# Patient Record
Sex: FEMALE | Race: WHITE | Employment: UNEMPLOYED | URBAN - METROPOLITAN AREA
[De-identification: names, ages, dates, MRNs, and addresses within clinical notes are randomized per-mention and may not be internally consistent; named-entity substitution may affect disease eponyms.]

---

## 2021-12-28 ENCOUNTER — HOSPITAL ENCOUNTER (EMERGENCY)
Age: 26
Discharge: HOME | DRG: 832 | End: 2021-12-28
Attending: EMERGENCY MEDICINE
Payer: MEDICARE

## 2021-12-28 ENCOUNTER — APPOINTMENT (OUTPATIENT)
Dept: ULTRASOUND IMAGING | Age: 26
DRG: 832 | End: 2021-12-28
Payer: MEDICARE

## 2021-12-28 ENCOUNTER — HOSPITAL ENCOUNTER (INPATIENT)
Age: 26
LOS: 1 days | Discharge: HOME | DRG: 832 | End: 2021-12-29
Attending: EMERGENCY MEDICINE | Admitting: OBSTETRICS & GYNECOLOGY
Payer: MEDICARE

## 2021-12-28 VITALS
SYSTOLIC BLOOD PRESSURE: 125 MMHG | RESPIRATION RATE: 20 BRPM | OXYGEN SATURATION: 98 % | DIASTOLIC BLOOD PRESSURE: 75 MMHG | HEART RATE: 86 BPM

## 2021-12-28 DIAGNOSIS — R11.15 CYCLICAL VOMITING SYNDROME: Primary | ICD-10-CM

## 2021-12-28 DIAGNOSIS — R11.2 NAUSEA AND VOMITING, INTRACTABILITY OF VOMITING NOT SPECIFIED, UNSPECIFIED VOMITING TYPE: ICD-10-CM

## 2021-12-28 DIAGNOSIS — F41.1 ANXIETY STATE: ICD-10-CM

## 2021-12-28 DIAGNOSIS — R10.9 INTRACTABLE ABDOMINAL PAIN: Primary | ICD-10-CM

## 2021-12-28 LAB
ACETAMINOPHEN LEVEL: <5 MCG/ML (ref 10–30)
ALBUMIN SERPL-MCNC: 4.6 G/DL (ref 3.5–5.2)
ALBUMIN SERPL-MCNC: 4.8 G/DL (ref 3.5–5.2)
ALP BLD-CCNC: 57 U/L (ref 35–104)
ALP BLD-CCNC: 62 U/L (ref 35–104)
ALT SERPL-CCNC: 14 U/L (ref 0–32)
ALT SERPL-CCNC: 22 U/L (ref 0–32)
AMPHETAMINE SCREEN, URINE: NOT DETECTED
ANION GAP SERPL CALCULATED.3IONS-SCNC: 17 MMOL/L (ref 7–16)
ANION GAP SERPL CALCULATED.3IONS-SCNC: 17 MMOL/L (ref 7–16)
AST SERPL-CCNC: 23 U/L (ref 0–31)
AST SERPL-CCNC: 84 U/L (ref 0–31)
BARBITURATE SCREEN URINE: NOT DETECTED
BASOPHILS ABSOLUTE: 0 E9/L (ref 0–0.2)
BASOPHILS ABSOLUTE: 0.04 E9/L (ref 0–0.2)
BASOPHILS RELATIVE PERCENT: 0.1 % (ref 0–2)
BASOPHILS RELATIVE PERCENT: 0.3 % (ref 0–2)
BENZODIAZEPINE SCREEN, URINE: NOT DETECTED
BILIRUB SERPL-MCNC: 0.4 MG/DL (ref 0–1.2)
BILIRUB SERPL-MCNC: 0.5 MG/DL (ref 0–1.2)
BUN BLDV-MCNC: 10 MG/DL (ref 6–20)
BUN BLDV-MCNC: 11 MG/DL (ref 6–20)
CALCIUM SERPL-MCNC: 9.4 MG/DL (ref 8.6–10.2)
CALCIUM SERPL-MCNC: 9.5 MG/DL (ref 8.6–10.2)
CANNABINOID SCREEN URINE: POSITIVE
CHLORIDE BLD-SCNC: 100 MMOL/L (ref 98–107)
CHLORIDE BLD-SCNC: 102 MMOL/L (ref 98–107)
CO2: 16 MMOL/L (ref 22–29)
CO2: 19 MMOL/L (ref 22–29)
COCAINE METABOLITE SCREEN URINE: NOT DETECTED
CREAT SERPL-MCNC: 0.4 MG/DL (ref 0.5–1)
CREAT SERPL-MCNC: 0.4 MG/DL (ref 0.5–1)
EKG ATRIAL RATE: 80 BPM
EKG P AXIS: 66 DEGREES
EKG P-R INTERVAL: 124 MS
EKG Q-T INTERVAL: 410 MS
EKG QRS DURATION: 74 MS
EKG QTC CALCULATION (BAZETT): 472 MS
EKG R AXIS: 26 DEGREES
EKG T AXIS: 41 DEGREES
EKG VENTRICULAR RATE: 80 BPM
EOSINOPHILS ABSOLUTE: 0 E9/L (ref 0.05–0.5)
EOSINOPHILS ABSOLUTE: 0.07 E9/L (ref 0.05–0.5)
EOSINOPHILS RELATIVE PERCENT: 0 % (ref 0–6)
EOSINOPHILS RELATIVE PERCENT: 0.6 % (ref 0–6)
ETHANOL: <10 MG/DL (ref 0–0.08)
FENTANYL SCREEN, URINE: NOT DETECTED
GFR AFRICAN AMERICAN: >60
GFR AFRICAN AMERICAN: >60
GFR NON-AFRICAN AMERICAN: >60 ML/MIN/1.73
GFR NON-AFRICAN AMERICAN: >60 ML/MIN/1.73
GLUCOSE BLD-MCNC: 128 MG/DL (ref 74–99)
GLUCOSE BLD-MCNC: 133 MG/DL (ref 74–99)
GONADOTROPIN, CHORIONIC (HCG) QUANT: ABNORMAL MIU/ML
HCG, URINE, POC: POSITIVE
HCT VFR BLD CALC: 36.9 % (ref 34–48)
HCT VFR BLD CALC: 40 % (ref 34–48)
HEMOGLOBIN: 13.2 G/DL (ref 11.5–15.5)
HEMOGLOBIN: 14.3 G/DL (ref 11.5–15.5)
IMMATURE GRANULOCYTES #: 0.06 E9/L
IMMATURE GRANULOCYTES %: 0.5 % (ref 0–5)
LIPASE: 22 U/L (ref 13–60)
LYMPHOCYTES ABSOLUTE: 0.5 E9/L (ref 1.5–4)
LYMPHOCYTES ABSOLUTE: 2.61 E9/L (ref 1.5–4)
LYMPHOCYTES RELATIVE PERCENT: 1.7 % (ref 20–42)
LYMPHOCYTES RELATIVE PERCENT: 21.4 % (ref 20–42)
Lab: ABNORMAL
Lab: NORMAL
MAGNESIUM: 1.9 MG/DL (ref 1.6–2.6)
MAGNESIUM: 2 MG/DL (ref 1.6–2.6)
MCH RBC QN AUTO: 32.3 PG (ref 26–35)
MCH RBC QN AUTO: 32.4 PG (ref 26–35)
MCHC RBC AUTO-ENTMCNC: 35.8 % (ref 32–34.5)
MCHC RBC AUTO-ENTMCNC: 35.8 % (ref 32–34.5)
MCV RBC AUTO: 90.3 FL (ref 80–99.9)
MCV RBC AUTO: 90.7 FL (ref 80–99.9)
METHADONE SCREEN, URINE: NOT DETECTED
MONOCYTES ABSOLUTE: 0.5 E9/L (ref 0.1–0.95)
MONOCYTES ABSOLUTE: 0.75 E9/L (ref 0.1–0.95)
MONOCYTES RELATIVE PERCENT: 3.4 % (ref 2–12)
MONOCYTES RELATIVE PERCENT: 4.1 % (ref 2–12)
NEGATIVE QC PASS/FAIL: NORMAL
NEUTROPHILS ABSOLUTE: 23.66 E9/L (ref 1.8–7.3)
NEUTROPHILS ABSOLUTE: 8.91 E9/L (ref 1.8–7.3)
NEUTROPHILS RELATIVE PERCENT: 73.1 % (ref 43–80)
NEUTROPHILS RELATIVE PERCENT: 94.8 % (ref 43–80)
OPIATE SCREEN URINE: POSITIVE
OXYCODONE URINE: NOT DETECTED
PDW BLD-RTO: 11.7 FL (ref 11.5–15)
PDW BLD-RTO: 11.8 FL (ref 11.5–15)
PHENCYCLIDINE SCREEN URINE: NOT DETECTED
PLATELET # BLD: 378 E9/L (ref 130–450)
PLATELET # BLD: 404 E9/L (ref 130–450)
PMV BLD AUTO: 9 FL (ref 7–12)
PMV BLD AUTO: 9.4 FL (ref 7–12)
POSITIVE QC PASS/FAIL: NORMAL
POTASSIUM SERPL-SCNC: 3.4 MMOL/L (ref 3.5–5)
POTASSIUM SERPL-SCNC: 3.5 MMOL/L (ref 3.5–5)
RBC # BLD: 4.07 E12/L (ref 3.5–5.5)
RBC # BLD: 4.43 E12/L (ref 3.5–5.5)
SALICYLATE, SERUM: <0.3 MG/DL (ref 0–30)
SARS-COV-2, NAAT: NOT DETECTED
SODIUM BLD-SCNC: 135 MMOL/L (ref 132–146)
SODIUM BLD-SCNC: 136 MMOL/L (ref 132–146)
TOTAL PROTEIN: 8.1 G/DL (ref 6.4–8.3)
TOTAL PROTEIN: 8.4 G/DL (ref 6.4–8.3)
TRICYCLIC ANTIDEPRESSANTS SCREEN SERUM: NEGATIVE NG/ML
TROPONIN, HIGH SENSITIVITY: <6 NG/L (ref 0–9)
WBC # BLD: 12.2 E9/L (ref 4.5–11.5)
WBC # BLD: 24.9 E9/L (ref 4.5–11.5)

## 2021-12-28 PROCEDURE — 80053 COMPREHEN METABOLIC PANEL: CPT

## 2021-12-28 PROCEDURE — 36415 COLL VENOUS BLD VENIPUNCTURE: CPT

## 2021-12-28 PROCEDURE — 96372 THER/PROPH/DIAG INJ SC/IM: CPT

## 2021-12-28 PROCEDURE — 6360000002 HC RX W HCPCS: Performed by: OBSTETRICS & GYNECOLOGY

## 2021-12-28 PROCEDURE — 80307 DRUG TEST PRSMV CHEM ANLYZR: CPT

## 2021-12-28 PROCEDURE — 83735 ASSAY OF MAGNESIUM: CPT

## 2021-12-28 PROCEDURE — 84702 CHORIONIC GONADOTROPIN TEST: CPT

## 2021-12-28 PROCEDURE — 6370000000 HC RX 637 (ALT 250 FOR IP): Performed by: STUDENT IN AN ORGANIZED HEALTH CARE EDUCATION/TRAINING PROGRAM

## 2021-12-28 PROCEDURE — 2580000003 HC RX 258: Performed by: OBSTETRICS & GYNECOLOGY

## 2021-12-28 PROCEDURE — 84484 ASSAY OF TROPONIN QUANT: CPT

## 2021-12-28 PROCEDURE — 2580000003 HC RX 258: Performed by: STUDENT IN AN ORGANIZED HEALTH CARE EDUCATION/TRAINING PROGRAM

## 2021-12-28 PROCEDURE — 85025 COMPLETE CBC W/AUTO DIFF WBC: CPT

## 2021-12-28 PROCEDURE — 2500000003 HC RX 250 WO HCPCS: Performed by: STUDENT IN AN ORGANIZED HEALTH CARE EDUCATION/TRAINING PROGRAM

## 2021-12-28 PROCEDURE — 6360000002 HC RX W HCPCS

## 2021-12-28 PROCEDURE — 1220000001 HC SEMI PRIVATE L&D R&B

## 2021-12-28 PROCEDURE — 6360000002 HC RX W HCPCS: Performed by: STUDENT IN AN ORGANIZED HEALTH CARE EDUCATION/TRAINING PROGRAM

## 2021-12-28 PROCEDURE — 2500000003 HC RX 250 WO HCPCS: Performed by: OBSTETRICS & GYNECOLOGY

## 2021-12-28 PROCEDURE — 96374 THER/PROPH/DIAG INJ IV PUSH: CPT

## 2021-12-28 PROCEDURE — 96375 TX/PRO/DX INJ NEW DRUG ADDON: CPT

## 2021-12-28 PROCEDURE — 6360000002 HC RX W HCPCS: Performed by: EMERGENCY MEDICINE

## 2021-12-28 PROCEDURE — 99284 EMERGENCY DEPT VISIT MOD MDM: CPT

## 2021-12-28 PROCEDURE — 99283 EMERGENCY DEPT VISIT LOW MDM: CPT

## 2021-12-28 PROCEDURE — 82077 ASSAY SPEC XCP UR&BREATH IA: CPT

## 2021-12-28 PROCEDURE — 83690 ASSAY OF LIPASE: CPT

## 2021-12-28 PROCEDURE — 93005 ELECTROCARDIOGRAM TRACING: CPT | Performed by: EMERGENCY MEDICINE

## 2021-12-28 PROCEDURE — 80179 DRUG ASSAY SALICYLATE: CPT

## 2021-12-28 PROCEDURE — 80143 DRUG ASSAY ACETAMINOPHEN: CPT

## 2021-12-28 PROCEDURE — 87635 SARS-COV-2 COVID-19 AMP PRB: CPT

## 2021-12-28 PROCEDURE — 2580000003 HC RX 258

## 2021-12-28 PROCEDURE — 76801 OB US < 14 WKS SINGLE FETUS: CPT

## 2021-12-28 RX ORDER — ONDANSETRON 2 MG/ML
4 INJECTION INTRAMUSCULAR; INTRAVENOUS EVERY 8 HOURS PRN
Status: DISCONTINUED | OUTPATIENT
Start: 2021-12-28 | End: 2021-12-29 | Stop reason: HOSPADM

## 2021-12-28 RX ORDER — HYDROXYZINE HYDROCHLORIDE 50 MG/ML
50 INJECTION, SOLUTION INTRAMUSCULAR ONCE
Status: COMPLETED | OUTPATIENT
Start: 2021-12-28 | End: 2021-12-28

## 2021-12-28 RX ORDER — DIPHENHYDRAMINE HYDROCHLORIDE 50 MG/ML
25 INJECTION INTRAMUSCULAR; INTRAVENOUS ONCE
Status: COMPLETED | OUTPATIENT
Start: 2021-12-28 | End: 2021-12-28

## 2021-12-28 RX ORDER — METOCLOPRAMIDE HYDROCHLORIDE 5 MG/ML
10 INJECTION INTRAMUSCULAR; INTRAVENOUS ONCE
Status: COMPLETED | OUTPATIENT
Start: 2021-12-28 | End: 2021-12-28

## 2021-12-28 RX ORDER — ACETAMINOPHEN 325 MG/1
650 TABLET ORAL EVERY 6 HOURS PRN
Status: DISCONTINUED | OUTPATIENT
Start: 2021-12-28 | End: 2021-12-29 | Stop reason: HOSPADM

## 2021-12-28 RX ORDER — SODIUM CHLORIDE 0.9 % (FLUSH) 0.9 %
SYRINGE (ML) INJECTION
Status: COMPLETED
Start: 2021-12-28 | End: 2021-12-28

## 2021-12-28 RX ORDER — DROPERIDOL 2.5 MG/ML
1.25 INJECTION, SOLUTION INTRAMUSCULAR; INTRAVENOUS ONCE
Status: COMPLETED | OUTPATIENT
Start: 2021-12-28 | End: 2021-12-28

## 2021-12-28 RX ORDER — 0.9 % SODIUM CHLORIDE 0.9 %
1000 INTRAVENOUS SOLUTION INTRAVENOUS ONCE
Status: COMPLETED | OUTPATIENT
Start: 2021-12-28 | End: 2021-12-28

## 2021-12-28 RX ORDER — PROMETHAZINE HYDROCHLORIDE 25 MG/ML
25 INJECTION, SOLUTION INTRAMUSCULAR; INTRAVENOUS ONCE
Status: COMPLETED | OUTPATIENT
Start: 2021-12-28 | End: 2021-12-28

## 2021-12-28 RX ORDER — DIPHENHYDRAMINE HYDROCHLORIDE 50 MG/ML
INJECTION INTRAMUSCULAR; INTRAVENOUS
Status: COMPLETED
Start: 2021-12-28 | End: 2021-12-28

## 2021-12-28 RX ORDER — PRENATAL WITH FERROUS FUM AND FOLIC ACID 3080; 920; 120; 400; 22; 1.84; 3; 20; 10; 1; 12; 200; 27; 25; 2 [IU]/1; [IU]/1; MG/1; [IU]/1; MG/1; MG/1; MG/1; MG/1; MG/1; MG/1; UG/1; MG/1; MG/1; MG/1; MG/1
1 TABLET ORAL DAILY
Status: DISCONTINUED | OUTPATIENT
Start: 2021-12-29 | End: 2021-12-29 | Stop reason: HOSPADM

## 2021-12-28 RX ORDER — SUCRALFATE 1 G/1
1 TABLET ORAL ONCE
Status: COMPLETED | OUTPATIENT
Start: 2021-12-28 | End: 2021-12-28

## 2021-12-28 RX ORDER — ACETAMINOPHEN 500 MG
1000 TABLET ORAL ONCE
Status: COMPLETED | OUTPATIENT
Start: 2021-12-28 | End: 2021-12-28

## 2021-12-28 RX ORDER — MORPHINE SULFATE 4 MG/ML
4 INJECTION, SOLUTION INTRAMUSCULAR; INTRAVENOUS ONCE
Status: COMPLETED | OUTPATIENT
Start: 2021-12-28 | End: 2021-12-28

## 2021-12-28 RX ADMIN — FAMOTIDINE 20 MG: 10 INJECTION, SOLUTION INTRAVENOUS at 12:01

## 2021-12-28 RX ADMIN — FAMOTIDINE 20 MG: 10 INJECTION, SOLUTION INTRAVENOUS at 23:46

## 2021-12-28 RX ADMIN — DROPERIDOL 1.25 MG: 2.5 INJECTION, SOLUTION INTRAMUSCULAR; INTRAVENOUS at 13:20

## 2021-12-28 RX ADMIN — ONDANSETRON 4 MG: 2 INJECTION INTRAMUSCULAR; INTRAVENOUS at 23:41

## 2021-12-28 RX ADMIN — DIPHENHYDRAMINE HYDROCHLORIDE 25 MG: 50 INJECTION, SOLUTION INTRAMUSCULAR; INTRAVENOUS at 09:08

## 2021-12-28 RX ADMIN — SODIUM CHLORIDE 1000 ML: 9 INJECTION, SOLUTION INTRAVENOUS at 21:30

## 2021-12-28 RX ADMIN — METOCLOPRAMIDE 10 MG: 5 INJECTION, SOLUTION INTRAMUSCULAR; INTRAVENOUS at 08:24

## 2021-12-28 RX ADMIN — HYDROXYZINE HYDROCHLORIDE 50 MG: 50 INJECTION, SOLUTION INTRAMUSCULAR at 10:05

## 2021-12-28 RX ADMIN — Medication: at 08:24

## 2021-12-28 RX ADMIN — MORPHINE SULFATE 4 MG: 4 INJECTION, SOLUTION INTRAMUSCULAR; INTRAVENOUS at 12:03

## 2021-12-28 RX ADMIN — DIPHENHYDRAMINE HYDROCHLORIDE: 50 INJECTION INTRAMUSCULAR; INTRAVENOUS at 09:28

## 2021-12-28 RX ADMIN — SUCRALFATE 1 G: 1 TABLET ORAL at 19:48

## 2021-12-28 RX ADMIN — PROMETHAZINE HYDROCHLORIDE 25 MG: 25 INJECTION INTRAMUSCULAR; INTRAVENOUS at 11:25

## 2021-12-28 RX ADMIN — METOCLOPRAMIDE 10 MG: 5 INJECTION, SOLUTION INTRAMUSCULAR; INTRAVENOUS at 19:48

## 2021-12-28 RX ADMIN — DIPHENHYDRAMINE HYDROCHLORIDE 25 MG: 50 INJECTION, SOLUTION INTRAMUSCULAR; INTRAVENOUS at 19:48

## 2021-12-28 RX ADMIN — AMPICILLIN SODIUM 2000 MG: 2 INJECTION, POWDER, FOR SOLUTION INTRAMUSCULAR; INTRAVENOUS at 23:59

## 2021-12-28 RX ADMIN — DIPHENHYDRAMINE HYDROCHLORIDE: 50 INJECTION, SOLUTION INTRAMUSCULAR; INTRAVENOUS at 09:28

## 2021-12-28 RX ADMIN — SODIUM CHLORIDE 1000 ML: 9 INJECTION, SOLUTION INTRAVENOUS at 12:00

## 2021-12-28 RX ADMIN — ACETAMINOPHEN 1000 MG: 500 TABLET, COATED ORAL at 22:31

## 2021-12-28 ASSESSMENT — ENCOUNTER SYMPTOMS
DIARRHEA: 0
RHINORRHEA: 0
EYE PAIN: 0
COUGH: 0
BACK PAIN: 0
WHEEZING: 0
ABDOMINAL PAIN: 1
NAUSEA: 1
SHORTNESS OF BREATH: 0
TROUBLE SWALLOWING: 0
SINUS PAIN: 0
SORE THROAT: 0
VOMITING: 0

## 2021-12-28 ASSESSMENT — PAIN SCALES - GENERAL
PAINLEVEL_OUTOF10: 10
PAINLEVEL_OUTOF10: 0
PAINLEVEL_OUTOF10: 10

## 2021-12-28 ASSESSMENT — PAIN DESCRIPTION - PAIN TYPE: TYPE: ACUTE PAIN

## 2021-12-28 ASSESSMENT — PAIN DESCRIPTION - LOCATION: LOCATION: ABDOMEN

## 2021-12-28 ASSESSMENT — PAIN DESCRIPTION - FREQUENCY: FREQUENCY: CONTINUOUS

## 2021-12-28 NOTE — ED NOTES
Additional 25 benadryl IM given per verbal order from dr Danielle Tavares, RN  12/28/21 [facility] [place] [facility]  12/28/21 8667

## 2021-12-28 NOTE — ED NOTES
Pt. unable to sit still and refuses EKG at this time     [facility]  12/28/21 0537

## 2021-12-28 NOTE — ED NOTES
Patient c/o chest pain again is unable to sit still for complete traige. Patient has multiple episodes of emesis.      Savanna Brown  12/28/21 8243

## 2021-12-28 NOTE — ED NOTES
Patient not answering triage questions , rolling around on cart. Here earlier today.  Triage information from earlier visit     Martha Hernandez RN  12/28/21 6171

## 2021-12-28 NOTE — ED NOTES
Bed: 22  Expected date:   Expected time:   Means of arrival:   Comments:     Rolo Bhakta RN  12/28/21 8391

## 2021-12-28 NOTE — ED NOTES
Pt refusing vitals states in too much pain     Alex [facility]  12/28/21 9676

## 2021-12-28 NOTE — ED PROVIDER NOTES
24-year-old G2, P1 currently 9-1/2 weeks pregnant female presents today to the emergency department with complaints of abdominal pain onset 1 hour prior to arrival today. Patient states that her pain is localized to the epigastrium and radiates into the chest.  She states that is radiating to her back. Symptoms are associated with nausea and anxiety. Patient states that she has a history of gastroparesis that is normally controlled with Ativan and morphine. She states that she has not had any Ativan or morphine for 7 weeks since she found out she was pregnant. Patient denies any fevers or chills. She denies any recent alcohol or drug use. She states that the symptoms were sudden onset, moderate in severity and progressively got worse. She denies any aggravating or alleviating factors. She continues to request Ativan and morphine stating that her anxiety is out of control. Review of Systems   Constitutional: Negative for diaphoresis and fever. HENT: Negative for ear pain, hearing loss, rhinorrhea, sinus pain, sore throat and trouble swallowing. Eyes: Negative for pain. Respiratory: Negative for cough, shortness of breath and wheezing. Cardiovascular: Negative for chest pain and palpitations. Gastrointestinal: Positive for abdominal pain and nausea. Negative for diarrhea and vomiting. Endocrine: Negative for polyuria. Genitourinary: Negative for flank pain, frequency, hematuria and urgency. Musculoskeletal: Negative for back pain, neck pain and neck stiffness. Neurological: Negative for dizziness, speech difficulty, weakness, light-headedness and numbness. Psychiatric/Behavioral: Positive for agitation. Negative for confusion. The patient is not nervous/anxious. Physical Exam  Constitutional:       General: She is not in acute distress. Appearance: Normal appearance. She is not ill-appearing, toxic-appearing or diaphoretic.    HENT:      Head: Normocephalic and atraumatic. Nose: No rhinorrhea. Eyes:      General: No scleral icterus. Extraocular Movements: Extraocular movements intact. Pupils: Pupils are equal, round, and reactive to light. Cardiovascular:      Heart sounds: Normal heart sounds. No murmur heard. No friction rub. No gallop. Pulmonary:      Breath sounds: Normal breath sounds. No wheezing, rhonchi or rales. Abdominal:      Palpations: Abdomen is soft. Tenderness: There is no abdominal tenderness. Musculoskeletal:         General: No swelling. Cervical back: Normal range of motion. No rigidity or tenderness. Right lower leg: No edema. Left lower leg: No edema. Lymphadenopathy:      Cervical: No cervical adenopathy. Skin:     General: Skin is warm and dry. Coloration: Skin is not jaundiced. Neurological:      General: No focal deficit present. Mental Status: She is alert and oriented to person, place, and time. Cranial Nerves: No cranial nerve deficit. Sensory: No sensory deficit. Motor: No weakness. Psychiatric:         Mood and Affect: Mood is anxious. Behavior: Behavior is agitated and hyperactive. Thought Content: Thought content normal.         Judgment: Judgment is impulsive and inappropriate. Procedures     MDM  Number of Diagnoses or Management Options  Anxiety state  Cyclical vomiting syndrome  Diagnosis management comments: This is a 68-year-old female who presents today to the emergency department with complaints of abdominal pain and nausea. Patient is visiting from [place] and does not have any healthcare providers in [place]. Symptoms started this morning. Patient states that she has a history of gastroparesis that is normally controlled with Ativan and morphine. Patient is currently 9 and half weeks pregnant. On arrival to the emergency department patient is tearful. She reports diffuse abdominal pain. She was given a shot of Phenergan. OB/GYN in [place]. Amount and/or Complexity of Data Reviewed  Clinical lab tests: reviewed  Tests in the radiology section of CPT®: reviewed  Tests in the medicine section of CPT®: reviewed         ED Course as of 12/28/21 1556   Tue Dec 28, 2021   1318 Drug Screen Comment[de-identified] see below [NS]      ED Course User Index  [NS] Yuly Anand DO       ED Course as of 12/28/21 1556   Tue Dec 28, 2021   1318 Drug Screen Comment[de-identified] see below [NS]      ED Course User Index  [NS] Yuly Anand DO       --------------------------------------------- PAST HISTORY ---------------------------------------------  Past Medical History:  has no past medical history on file. Past Surgical History:  has no past surgical history on file. Social History:  reports that she has never smoked. She has never used smokeless tobacco. She reports that she does not use drugs. Family History: family history is not on file. The patients home medications have been reviewed. Allergies: Patient has no known allergies.     -------------------------------------------------- RESULTS -------------------------------------------------  Labs:  Results for orders placed or performed during the hospital encounter of 12/28/21   CBC Auto Differential   Result Value Ref Range    WBC 12.2 (H) 4.5 - 11.5 E9/L    RBC 4.43 3.50 - 5.50 E12/L    Hemoglobin 14.3 11.5 - 15.5 g/dL    Hematocrit 40.0 34.0 - 48.0 %    MCV 90.3 80.0 - 99.9 fL    MCH 32.3 26.0 - 35.0 pg    MCHC 35.8 (H) 32.0 - 34.5 %    RDW 11.7 11.5 - 15.0 fL    Platelets 781 057 - 923 E9/L    MPV 9.4 7.0 - 12.0 fL    Neutrophils % 73.1 43.0 - 80.0 %    Immature Granulocytes % 0.5 0.0 - 5.0 %    Lymphocytes % 21.4 20.0 - 42.0 %    Monocytes % 4.1 2.0 - 12.0 %    Eosinophils % 0.6 0.0 - 6.0 %    Basophils % 0.3 0.0 - 2.0 %    Neutrophils Absolute 8.91 (H) 1.80 - 7.30 E9/L    Immature Granulocytes # 0.06 E9/L    Lymphocytes Absolute 2.61 1.50 - 4.00 E9/L    Monocytes Absolute 0.50 0.10 - 0.95 E9/L    Eosinophils Absolute 0.07 0.05 - 0.50 E9/L    Basophils Absolute 0.04 0.00 - 0.20 E9/L   Comprehensive Metabolic Panel   Result Value Ref Range    Sodium 136 132 - 146 mmol/L    Potassium 3.5 3.5 - 5.0 mmol/L    Chloride 100 98 - 107 mmol/L    CO2 19 (L) 22 - 29 mmol/L    Anion Gap 17 (H) 7 - 16 mmol/L    Glucose 133 (H) 74 - 99 mg/dL    BUN 10 6 - 20 mg/dL    CREATININE 0.4 (L) 0.5 - 1.0 mg/dL    GFR Non-African American >60 >=60 mL/min/1.73    GFR African American >60     Calcium 9.5 8.6 - 10.2 mg/dL    Total Protein 8.1 6.4 - 8.3 g/dL    Albumin 4.6 3.5 - 5.2 g/dL    Total Bilirubin 0.5 0.0 - 1.2 mg/dL    Alkaline Phosphatase 62 35 - 104 U/L    ALT 14 0 - 32 U/L    AST 23 0 - 31 U/L   Magnesium   Result Value Ref Range    Magnesium 2.0 1.6 - 2.6 mg/dL   Troponin   Result Value Ref Range    Troponin, High Sensitivity <6 0 - 9 ng/L   URINE DRUG SCREEN   Result Value Ref Range    Drug Screen Comment: see below    Serum Drug Screen   Result Value Ref Range    Ethanol Lvl <10 mg/dL    Acetaminophen Level <5.0 (L) 10.0 - 10.1 mcg/mL    Salicylate, Serum <9.9 0.0 - 30.0 mg/dL   hCG, quantitative, pregnancy   Result Value Ref Range    hCG Quant 25667.0 (H) <10 mIU/mL   POC Pregnancy Urine Qual   Result Value Ref Range    HCG, Urine, POC Positive Negative    Lot Number [id number]     Positive QC Pass/Fail Pass     Negative QC Pass/Fail Pass    EKG 12 Lead   Result Value Ref Range    Ventricular Rate 80 BPM    Atrial Rate 80 BPM    P-R Interval 124 ms    QRS Duration 74 ms    Q-T Interval 410 ms    QTc Calculation (Bazett) 472 ms    P Axis 66 degrees    R Axis 26 degrees    T Axis 41 degrees       Radiology:  US OB LESS THAN 14 WEEKS SINGLE OR FIRST GESTATION   Final Result   1. Single live intrauterine gestation with an age of 9 weeks, 2 days. 2. ERON per ultrasound: 07/24/2022. 3.  No perigestational hemorrhage or other gross abnormality detected.       RECOMMENDATIONS:   Unavailable ------------------------- NURSING NOTES AND VITALS REVIEWED ---------------------------  Date / Time Roomed:  12/28/2021  7:49 AM  ED Bed Assignment:  22/22    The nursing notes within the ED encounter and vital signs as below have been reviewed. /75   Pulse 86   Resp 20   SpO2 98%   Oxygen Saturation Interpretation: Normal      ------------------------------------------ PROGRESS NOTES ------------------------------------------  3:56 PM EST  I have spoken with the patient and discussed todays results, in addition to providing specific details for the plan of care and counseling regarding the diagnosis and prognosis. Their questions are answered at this time and they are agreeable with the plan. I discussed at length with them reasons for immediate return here for re evaluation. They will followup with their primary care physician by calling their office tomorrow. --------------------------------- ADDITIONAL PROVIDER NOTES ---------------------------------  At this time the patient is without objective evidence of an acute process requiring hospitalization or inpatient management. They have remained hemodynamically stable throughout their entire ED visit and are stable for discharge with outpatient follow-up. The plan has been discussed in detail and they are aware of the specific conditions for emergent return, as well as the importance of follow-up. There are no discharge medications for this patient. Diagnosis:  1. Cyclical vomiting syndrome    2. Anxiety state        Disposition:  Patient's disposition: Discharge to home  Patient's condition is stable.      Mary Carey DO  Resident  12/28/21 6455

## 2021-12-28 NOTE — ED NOTES
Pt continues to get out of bed and walk around hallways, repeatedly escorted back to bed by multiple staff      Alex, Pending sale to [facility]  12/28/21 4101

## 2021-12-29 ENCOUNTER — APPOINTMENT (OUTPATIENT)
Dept: ULTRASOUND IMAGING | Age: 26
DRG: 832 | End: 2021-12-29
Payer: MEDICARE

## 2021-12-29 VITALS
SYSTOLIC BLOOD PRESSURE: 130 MMHG | HEART RATE: 50 BPM | WEIGHT: 154 LBS | DIASTOLIC BLOOD PRESSURE: 62 MMHG | OXYGEN SATURATION: 98 % | HEIGHT: 67 IN | RESPIRATION RATE: 18 BRPM | TEMPERATURE: 98.5 F | BODY MASS INDEX: 24.17 KG/M2

## 2021-12-29 LAB
T4 FREE: 1.5 NG/DL (ref 0.93–1.7)
TSH SERPL DL<=0.05 MIU/L-ACNC: 1.01 UIU/ML (ref 0.27–4.2)

## 2021-12-29 PROCEDURE — 2580000003 HC RX 258: Performed by: OBSTETRICS & GYNECOLOGY

## 2021-12-29 PROCEDURE — 84439 ASSAY OF FREE THYROXINE: CPT

## 2021-12-29 PROCEDURE — 6370000000 HC RX 637 (ALT 250 FOR IP): Performed by: OBSTETRICS & GYNECOLOGY

## 2021-12-29 PROCEDURE — 96374 THER/PROPH/DIAG INJ IV PUSH: CPT

## 2021-12-29 PROCEDURE — 99222 1ST HOSP IP/OBS MODERATE 55: CPT

## 2021-12-29 PROCEDURE — 76705 ECHO EXAM OF ABDOMEN: CPT

## 2021-12-29 PROCEDURE — 2500000003 HC RX 250 WO HCPCS: Performed by: OBSTETRICS & GYNECOLOGY

## 2021-12-29 PROCEDURE — 99211 OFF/OP EST MAY X REQ PHY/QHP: CPT

## 2021-12-29 PROCEDURE — 6370000000 HC RX 637 (ALT 250 FOR IP)

## 2021-12-29 PROCEDURE — 36415 COLL VENOUS BLD VENIPUNCTURE: CPT

## 2021-12-29 PROCEDURE — 6360000002 HC RX W HCPCS: Performed by: OBSTETRICS & GYNECOLOGY

## 2021-12-29 PROCEDURE — 84443 ASSAY THYROID STIM HORMONE: CPT

## 2021-12-29 RX ORDER — ESCITALOPRAM OXALATE 20 MG/1
20 TABLET ORAL DAILY
Status: DISCONTINUED | OUTPATIENT
Start: 2021-12-29 | End: 2021-12-29 | Stop reason: HOSPADM

## 2021-12-29 RX ORDER — PROMETHAZINE HYDROCHLORIDE 25 MG/1
25 SUPPOSITORY RECTAL EVERY 6 HOURS PRN
Status: DISCONTINUED | OUTPATIENT
Start: 2021-12-29 | End: 2021-12-29 | Stop reason: HOSPADM

## 2021-12-29 RX ORDER — NALBUPHINE HCL 10 MG/ML
10 AMPUL (ML) INJECTION EVERY 6 HOURS PRN
Status: DISCONTINUED | OUTPATIENT
Start: 2021-12-29 | End: 2021-12-29 | Stop reason: HOSPADM

## 2021-12-29 RX ORDER — BUSPIRONE HYDROCHLORIDE 5 MG/1
15 TABLET ORAL 3 TIMES DAILY
Status: DISCONTINUED | OUTPATIENT
Start: 2021-12-29 | End: 2021-12-29 | Stop reason: HOSPADM

## 2021-12-29 RX ORDER — SODIUM CHLORIDE 0.9 % (FLUSH) 0.9 %
5-40 SYRINGE (ML) INJECTION PRN
Status: DISCONTINUED | OUTPATIENT
Start: 2021-12-29 | End: 2021-12-29 | Stop reason: HOSPADM

## 2021-12-29 RX ORDER — DEXTROSE AND SODIUM CHLORIDE 5; .45 G/100ML; G/100ML
INJECTION, SOLUTION INTRAVENOUS CONTINUOUS
Status: DISCONTINUED | OUTPATIENT
Start: 2021-12-29 | End: 2021-12-29 | Stop reason: CLARIF

## 2021-12-29 RX ORDER — DEXTROSE, SODIUM CHLORIDE, AND POTASSIUM CHLORIDE 5; .45; .15 G/100ML; G/100ML; G/100ML
INJECTION INTRAVENOUS CONTINUOUS
Status: DISCONTINUED | OUTPATIENT
Start: 2021-12-29 | End: 2021-12-29 | Stop reason: HOSPADM

## 2021-12-29 RX ORDER — DEXTROMETHORPHAN HYDROBROMIDE AND PROMETHAZINE HYDROCHLORIDE 15; 6.25 MG/5ML; MG/5ML
SYRUP ORAL 4 TIMES DAILY PRN
COMMUNITY

## 2021-12-29 RX ORDER — DIPHENHYDRAMINE HCL 25 MG
25 TABLET ORAL EVERY 6 HOURS PRN
Status: DISCONTINUED | OUTPATIENT
Start: 2021-12-29 | End: 2021-12-29 | Stop reason: HOSPADM

## 2021-12-29 RX ORDER — ESCITALOPRAM OXALATE 20 MG/1
20 TABLET ORAL DAILY
COMMUNITY

## 2021-12-29 RX ADMIN — FOLIC ACID: 5 INJECTION, SOLUTION INTRAMUSCULAR; INTRAVENOUS; SUBCUTANEOUS at 03:51

## 2021-12-29 RX ADMIN — AMPICILLIN SODIUM 1000 MG: 1 INJECTION, POWDER, FOR SOLUTION INTRAMUSCULAR; INTRAVENOUS at 14:46

## 2021-12-29 RX ADMIN — FAMOTIDINE 20 MG: 10 INJECTION, SOLUTION INTRAVENOUS at 08:55

## 2021-12-29 RX ADMIN — DIPHENHYDRAMINE HCL 25 MG: 25 TABLET ORAL at 10:04

## 2021-12-29 RX ADMIN — NALBUPHINE HYDROCHLORIDE 10 MG: 10 INJECTION, SOLUTION INTRAMUSCULAR; INTRAVENOUS; SUBCUTANEOUS at 08:05

## 2021-12-29 RX ADMIN — NALBUPHINE HYDROCHLORIDE 10 MG: 10 INJECTION, SOLUTION INTRAMUSCULAR; INTRAVENOUS; SUBCUTANEOUS at 01:25

## 2021-12-29 RX ADMIN — ONDANSETRON 4 MG: 2 INJECTION INTRAMUSCULAR; INTRAVENOUS at 14:46

## 2021-12-29 RX ADMIN — ESCITALOPRAM OXALATE 20 MG: 20 TABLET ORAL at 10:40

## 2021-12-29 RX ADMIN — BUSPIRONE HYDROCHLORIDE 15 MG: 5 TABLET ORAL at 14:46

## 2021-12-29 RX ADMIN — AMPICILLIN SODIUM 1000 MG: 1 INJECTION, POWDER, FOR SOLUTION INTRAMUSCULAR; INTRAVENOUS at 06:54

## 2021-12-29 RX ADMIN — ONDANSETRON 4 MG: 2 INJECTION INTRAMUSCULAR; INTRAVENOUS at 08:05

## 2021-12-29 ASSESSMENT — ENCOUNTER SYMPTOMS
RHINORRHEA: 0
SHORTNESS OF BREATH: 0
VOMITING: 1
SORE THROAT: 0
ABDOMINAL PAIN: 1
BACK PAIN: 0
DIARRHEA: 0
NAUSEA: 1
COUGH: 0

## 2021-12-29 ASSESSMENT — PAIN SCALES - GENERAL
PAINLEVEL_OUTOF10: 6
PAINLEVEL_OUTOF10: 6

## 2021-12-29 NOTE — CONSULTS
PSYCHIATRY ATTENDING CONSULT    REASON FOR CONSULT:  Anxiety    REQUESTING PHYSICIAN:  Alisa Lee    CHIEF COMPLAINT: \"anxiety\"    HISTORY OF PRESENT ILLNESS:  Samanta Zapata  is a 27 y.o. female who was admitted on 12/28/21 due to abdominal pain with nausea and anxiety. Per note she has a history of gastroparesis and is 10 weeks pregnant. Chart reviewed. Note home psychotropics Lexapro 20 mg daily. Urine toxicology positive for opiates and cannabis. Psychiatry being consulted for anxiety. On assessment, patient is sitting up in bed smiling cooperative forthcoming with assessment. States she is feeling much better. She is not tearful, flat or agitated. She is oriented and appropriate. She rates her anxiety a zero at this time. \"I'm not anxious right now. \" She states that she is from [place] and this happens frequently that she gets abdominal pain and then her anxiety increases. At this time she reports no anxiety and she sees a counselor every month in [place] for her anxiety. Therapeutic communication given. Discussed other options to decreasing anxiety other than medication. Patient has a very good understanding of her anxiety and what to do to help alleviate her anxiety. She spoke of her counselor which is helpful in taking warm baths. She states she also has a marijuana card in [place] that she uses once a week for her anxiety, depression and pain in her stomach. She denies using opiates and states they may have given her opiates in the ER for her pain. She states she does have trouble sleeping and would be open for some medication for sleep. She states she is eating and drinking well. Patient denies suicidal or homicidal ideations, intentions or plans. Patient is not manic or psychotic. PAST PSYCHIATRIC HISTORY: Patient reports she has a counselor in [place] for her anxiety and depression and sees her PCP for her depression medication.   She reports that she started feeling anxious at the age of 21years of age. At this time she was pregnant with her son and was fearful of his dad. PAST MEDICAL HISTORY:       Diagnosis Date    Mental disorder     Postpartum depression      PAST SURGICAL HISTORY:       Procedure Laterality Date    ANKLE CLOSED REDUCTION Right     broke 3 bones     BREAST ENHANCEMENT SURGERY Bilateral     CHOLECYSTECTOMY      ENDOSCOPY, COLON, DIAGNOSTIC      WISDOM TOOTH EXTRACTION Bilateral        MEDICATIONS: Current Facility-Administered Medications: dextrose 5 % and 0.45 % NaCl with KCl 20 mEq infusion, , IntraVENous, Continuous  nalbuphine (NUBAIN) injection 10 mg, 10 mg, IntraVENous, Q6H PRN  escitalopram (LEXAPRO) tablet 20 mg, 20 mg, Oral, Daily  sodium chloride flush 0.9 % injection 5-40 mL, 5-40 mL, IntraVENous, PRN  diphenhydrAMINE (BENADRYL) tablet 25 mg, 25 mg, Oral, Q6H PRN  promethazine (PHENERGAN) suppository 25 mg, 25 mg, Rectal, Q6H PRN  famotidine (PEPCID) injection 20 mg, 20 mg, IntraVENous, BID  ondansetron (ZOFRAN) injection 4 mg, 4 mg, IntraVENous, Q8H PRN  ampicillin 1000 mg ivpb mini bag, 1,000 mg, IntraVENous, 4 times per day  acetaminophen (TYLENOL) tablet 650 mg, 650 mg, Oral, Q6H PRN  prenatal vitamin 27-1 MG tablet 1 tablet, 1 tablet, Oral, Daily    ALLERGIES:  Haldol [haloperidol]    FAMILY PSYCHIATRIC HISTORY: States that mother has anxiety and father has depression. SOCIAL HISTORY: Patient is a single single mom. 1 son and lives in the [facility]. SUBSTANCE ABUSE HISTORY:  reports that she has never smoked. She has never used smokeless tobacco. She reports previous alcohol use. She reports current drug use. Drug: Marijuana Fredick Copping).     VITALS:   Vitals:    12/29/21 0805   BP: 130/62   Pulse: 50   Resp: 18   Temp: 98.5 °F (36.9 °C)   SpO2: 98%       LABS:CBC:  Recent Labs     12/28/21  0807 12/28/21  2225   WBC 12.2* 24.9*   RBC 4.43 4.07   HGB 14.3 13.2   HCT 40.0 36.9   MCV 90.3 90.7   RDW 11.7 11.8    378 CHEMISTRIES:  Recent Labs     12/28/21  0807 12/28/21  2225    135   K 3.5 3.4*    102   CO2 19* 16*   BUN 10 11   CREATININE 0.4* 0.4*   GLUCOSE 133* 128*   MG 2.0 1.9   PT/INR:No results for input(s): PROTIME, INR in the last 72 hours. APTT:No results for input(s): APTT in the last 72 hours. LIVER PROFILE:  Recent Labs     12/28/21  0807 12/28/21  2225   AST 23 84*   ALT 14 22   BILITOT 0.5 0.4   ALKPHOS 62 62       MENTAL STATUS EXAMINATION  White female appears age. Pleasant, cooperative, forthcoming. Normal psychomotor activity, strength, tone, eye contact. Gait not assessed. Mood euthymic. Affect flexible. Speech clear. Thoughts organized. Content Future oriented. No suicidal or homicidal ideations. No paranoia, delusions or hallucinations. Orientation, concentration, recent and remote memory are grossly intact. Fund of knowledge fair. Language use fair. Insight and judgment fair      ASSESSMENT:  Generalized anxiety disorder     Cannabis use disorder       PLAN & RECOMMENDATIONS: Patient currently has a counselor in [place] that she sees on a regular basis which is the best treatment for anxiety. She is currently on Lexapro and she feels that is working well for her. We will add BuSpar for her anxiety while she is in the hospital. Patient states she will check with her OB/GYN when she gets to [place] what medications she should take while pregnant. No indication for psychiatric admission. Encouraged outpatient counseling more than once a month if she needs. Okay to discharge from my standpoint when medically stable. Psychiatry will sign off.       Standley Burkitt, APRN - CNP 12/29/2021 11:42 AM

## 2021-12-29 NOTE — PROGRESS NOTES
Patient received from ER via cart and placed in 213. Patient trashing and states she is having pain midline upper ABD that radiates up chest to throat and has been vomiting all day. Patient states her pain is 10 on 1-10 scale. Patient denies any pregnancy concerns and denies pelvic and back discomfort, also denies and vaginal discharge or bleeding. Patient gives an estimated due date of 7/27/22.

## 2021-12-29 NOTE — ED NOTES
Patient stating \"I dont feel right, why am I still feeling so anxious\"     Sydnee Mart RN  12/28/21 2049

## 2021-12-29 NOTE — ED PROVIDER NOTES
any fevers, neck pain or stiffness, cough or sore throat, chest pain or palpitations, shortness of breath, diarrhea, black or bloody stools, urinary symptoms, numbness or tingling anywhere, lower extremity edema or tenderness. Denies any vaginal bleeding or discharge. Denies any pelvic pain. States that this abdominal pain and anxiety symptoms feel the same as episode she has had in the past. Denies any SI/HI, AH/VH. Review of Systems   Constitutional: Negative for chills and fever. HENT: Negative for rhinorrhea and sore throat. Eyes: Negative for visual disturbance. Respiratory: Negative for cough and shortness of breath. Cardiovascular: Negative for chest pain and palpitations. Gastrointestinal: Positive for abdominal pain, nausea and vomiting. Negative for diarrhea. Endocrine: Negative for polydipsia and polyuria. Genitourinary: Negative for dysuria and frequency. Musculoskeletal: Negative for back pain and myalgias. Skin: Negative for rash and wound. Neurological: Negative for weakness and headaches. Psychiatric/Behavioral: Negative for confusion, hallucinations and suicidal ideas. The patient is nervous/anxious. All other systems reviewed and are negative. Physical Exam  Vitals and nursing note reviewed. Constitutional:       Appearance: She is not toxic-appearing. Comments: Patient is alert, oriented, she is not toxic in appearance; she has a labile affect, intermittently tearful, sometimes flat, she is mostly cooperative; she is up and down out of bed, fidgety, anxious appearing   HENT:      Head: Normocephalic and atraumatic. Right Ear: External ear normal.      Left Ear: External ear normal.      Nose: Nose normal. No rhinorrhea. Mouth/Throat:      Mouth: Mucous membranes are moist.      Pharynx: Oropharynx is clear. Eyes:      Extraocular Movements: Extraocular movements intact.       Conjunctiva/sclera: Conjunctivae normal.      Pupils: Pupils are equal, round, and reactive to light. Cardiovascular:      Rate and Rhythm: Normal rate and regular rhythm. Pulses: Normal pulses. Heart sounds: Normal heart sounds. Pulmonary:      Effort: Pulmonary effort is normal. No respiratory distress. Breath sounds: Normal breath sounds. No wheezing or rales. Abdominal:      General: Abdomen is flat. Bowel sounds are normal. There is no distension. Palpations: Abdomen is soft. Tenderness: There is abdominal tenderness in the epigastric area. There is no right CVA tenderness, left CVA tenderness, guarding or rebound. Musculoskeletal:         General: Normal range of motion. Cervical back: Normal range of motion and neck supple. Right lower leg: No edema. Left lower leg: No edema. Skin:     General: Skin is warm and dry. Capillary Refill: Capillary refill takes less than 2 seconds. Coloration: Skin is not cyanotic, jaundiced or pale. Findings: No rash. Neurological:      General: No focal deficit present. Mental Status: She is alert and oriented to person, place, and time. Sensory: No sensory deficit. Motor: No weakness. Gait: Gait normal.   Psychiatric:         Mood and Affect: Mood is anxious. Affect is labile. Behavior: Behavior normal.         Judgment: Judgment is impulsive and inappropriate. Procedures       [facility]     ED Course as of 12/29/21 0206   Tue Dec 28, 2021   1919 Since 2016 patient has been repeatedly seen for intractable nausea and vomiting and has been advised per chart review as far back as May 2016 to stop smoking marijuana as it can cause nausea and vomiting.   She has been diagnosed with gastritis, chronic abdominal pain, cyclical nausea and vomiting in the past.  She was here earlier today for the same symptoms that she is presenting for now and was given a multitude of medications including droperidol, Vistaril, Benadryl, Reglan, Pepcid, IV fluids, Phenergan, morphine. Urine drug screen was positive for cannabinoids. Patient is 9 weeks pregnant confirmed by ultrasound during this encounter earlier today and she was reportedly counseled on the risks associated with the various medication she was being given during pregnancy at that time. [VG]   2110 Patient symptoms initially improved, however she continues to repeatedly get out of bed and come over to ask the physician or other providers to come and see her or come and talk to her, she refuses to wear her mask, she refuses to stay in bed, she is intermittently agitated and very antsy. [VG]   2132 Patient is extremely anxious, she is tearful, states that she is scared that she is not going to get better; when I asked her what she means is and if she is more scared about her abdominal pain or her anxiety, she states both. Patient obviously has some component of psychiatric issues and states that she usually improves with Ativan but is unable to take it due to pregnancy. She is fidgety, extremely labile and tearful; she denies SI/HI, denies AH/VH. [VG]   2148   ATTENDING PROVIDER ATTESTATION:     I have personally performed and/or participated in the history, exam, medical decision making, and procedures and agree with all pertinent clinical information unless otherwise noted. I have also reviewed and agree with the past medical, family and social history unless otherwise noted. I have discussed this patient in detail with the resident and provided the instruction and education regarding the evidence-based evaluation and treatment of abdominal pain with associated nausea vomiting. History: Patient presents to the ED for evaluation of mid abdominal pain with associated nausea and vomiting. Patient was seen here earlier today and had a full work-up including labs and ultrasound. Chart review shows that patient has had a long history of nausea and vomiting.   Patient admits to marijuana use and knows see any healthcare providers in [place]; she has been having symptoms all day; she reports a history of gastroparesis, per chart review she has been seen for the symptoms numerous times dating back as far as 2016 in the chart; she has been diagnosed with gastritis, chronic abdominal pain, cyclical nausea and vomiting, and counseled on the past on possible association with marijuana use. In the ER earlier today she was treated with a multitude of medications as noted above and discharged improved. In the ER now on this encounter, the patient presents with the same symptoms as before; her vitals are stable, she is afebrile, she did not vomit while in the department. Patient is alert, oriented, she is not toxic in appearance; she has a labile affect, intermittently tearful, sometimes flat, she is mostly cooperative; she is up and down out of bed, fidgety, anxious appearing. Patient is extremely anxious, getting up out of the bed and walking across the ER to talk to the doctor numerous times, requires redirecting, she is fidgeting in the bed and constantly moving; she was given IV fluids, Reglan, Benadryl, Carafate, Tylenol with minimal improvement of her symptoms; she has an extremely labile affect, she is intermittently tearful, denies any SI/HI. I did discuss with her that there are risks associated treatment of her symptoms during pregnancy and that having a specialist evaluate and treat her at this point would be prudent.  Given that multiple interventions today in the ER have not improved her symptoms and the patient is returning for the same complaint twice in one day, as well as that she is pregnant and the medications being given carry risks to her pregnancy, feel she requires admission for further treatment and evaluation specifically with OB/GYN and psychiatry; of note, patient's abdominal exam during this encounter significant for epigastric tenderness to palpation, abdomen is soft without rebound or guarding; she is very anxious and is asking to be given something to calm her anxiety. Additionally, given that decision was made to admit this patient for further treatment and evaluation of the symptoms, additional lab work was performed at this time which did end up showing a white count of 24.9 up from 12.2 when patient was in the ER earlier today; patient is afebrile, I greatly suspect that this acute increase in WBCs is related to patient's hysteric state over the past nearly 20 hours. Decision made to admit this patient for further treatment and evaluation of significant acute anxiety state, intractable acute on chronic abdominal pain, in context of pregnancy 10 weeks. Discussed results and plan with patient, she voiced understanding and is agreeable to this plan. Spoke with Dr. Yanni Park (OB/GYN), discussed case, this patient is accepted for admission. Given patient's chronic history also spoke with internal medicine and discussed case, they will provide consultation for this patient. Routine consult for psychiatry also placed. I have discussed this patient with my attending, who has seen the patient and agrees with this disposition. Patient was seen and evaluated by myself and my attending Kayleen Velez DO. Assessment and Plan discussed with attending provider, please see attestation for final plan of care.         --------------------------------------------- PAST HISTORY ---------------------------------------------  Past Medical History:  has a past medical history of Mental disorder and Postpartum depression. Past Surgical History:  has a past surgical history that includes Cholecystectomy; Ankle Closed Reduction (Right); Endoscopy, colon, diagnostic; Breast enhancement surgery (Bilateral); and [place] tooth extraction (Bilateral). Social History:  reports that she has never smoked. She has never used smokeless tobacco. She reports previous alcohol use. She reports current drug use.  Drug: Marijuana (Anika Smith). Family History: family history is not on file. The patients home medications have been reviewed.     Allergies: Haldol [haloperidol]    -------------------------------------------------- RESULTS -------------------------------------------------    LABS:  Results for orders placed or performed during the hospital encounter of 12/28/21   COVID-19, Rapid    Specimen: Nasopharyngeal Swab   Result Value Ref Range    SARS-CoV-2, NAAT Not Detected Not Detected   CBC auto differential   Result Value Ref Range    WBC 24.9 (H) 4.5 - 11.5 E9/L    RBC 4.07 3.50 - 5.50 E12/L    Hemoglobin 13.2 11.5 - 15.5 g/dL    Hematocrit 36.9 34.0 - 48.0 %    MCV 90.7 80.0 - 99.9 fL    MCH 32.4 26.0 - 35.0 pg    MCHC 35.8 (H) 32.0 - 34.5 %    RDW 11.8 11.5 - 15.0 fL    Platelets 272 414 - 147 E9/L    MPV 9.0 7.0 - 12.0 fL    Neutrophils % 94.8 (H) 43.0 - 80.0 %    Lymphocytes % 1.7 (L) 20.0 - 42.0 %    Monocytes % 3.4 2.0 - 12.0 %    Eosinophils % 0.0 0.0 - 6.0 %    Basophils % 0.1 0.0 - 2.0 %    Neutrophils Absolute 23.66 (H) 1.80 - 7.30 E9/L    Lymphocytes Absolute 0.50 (L) 1.50 - 4.00 E9/L    Monocytes Absolute 0.75 0.10 - 0.95 E9/L    Eosinophils Absolute 0.00 (L) 0.05 - 0.50 E9/L    Basophils Absolute 0.00 0.00 - 0.20 E9/L   Comprehensive metabolic panel   Result Value Ref Range    Sodium 135 132 - 146 mmol/L    Potassium 3.4 (L) 3.5 - 5.0 mmol/L    Chloride 102 98 - 107 mmol/L    CO2 16 (L) 22 - 29 mmol/L    Anion Gap 17 (H) 7 - 16 mmol/L    Glucose 128 (H) 74 - 99 mg/dL    BUN 11 6 - 20 mg/dL    CREATININE 0.4 (L) 0.5 - 1.0 mg/dL    GFR Non-African American >60 >=60 mL/min/1.73    GFR African American >60     Calcium 9.4 8.6 - 10.2 mg/dL    Total Protein 8.4 (H) 6.4 - 8.3 g/dL    Albumin 4.8 3.5 - 5.2 g/dL    Total Bilirubin 0.4 0.0 - 1.2 mg/dL    Alkaline Phosphatase 57 35 - 104 U/L    ALT 22 0 - 32 U/L    AST 84 (H) 0 - 31 U/L   Lipase   Result Value Ref Range    Lipase 22 13 - 60 U/L   Magnesium   Result

## 2021-12-29 NOTE — H&P
Department of Obstetrics and Gynecology   Obstetrics History and Physical      [facility]  2021  [id number]    CHIEF COMPLAINT: Nausea vomiting, anxiety    HISTORY OF PRESENT ILLNESS:      The patient is a 27 y.o. female  at 10w0d. Visiting from [place] for a week and started having upper abdominal pain and nausea and vomiting patient is on medical marijuana smoking daily every few hours now she has slowed down to few times a day.,  Patient on Lexapro from a [place] psychiatrist for anxiety. Patient is on Phenergan tablets for nausea and vomiting. Patient was in the ER for good amount of time and received different medications but no relief so now she is admitted for observation and further treatment and evaluation. Patient reports that she has a gastroparesis as diagnosed by gastroenterologist in [place].   OB History        3    Para   1    Term   1            AB   1    Living   1       SAB        IAB        Ectopic        Molar        Multiple        Live Births   1            Patient presents with a chief complaint as above and is being admitted for evaluation    Estimated Due Date: Estimated Date of Delivery: 22    PRENATAL CARE:    Complicated by:   Patient Active Problem List   Diagnosis Code    Intractable abdominal pain R10.9       PAST OB HISTORY  OB History        3    Para   1    Term   1            AB   1    Living   1       SAB        IAB        Ectopic        Molar        Multiple        Live Births   1                Past Medical History:        Diagnosis Date    Mental disorder     Postpartum depression      Past Surgical History:        Procedure Laterality Date    ANKLE CLOSED REDUCTION Right     broke 3 bones     BREAST ENHANCEMENT SURGERY Bilateral     CHOLECYSTECTOMY      ENDOSCOPY, COLON, DIAGNOSTIC      WISDOM TOOTH EXTRACTION Bilateral      Allergies:  Haldol [haloperidol]  Social History:    Social History     Socioeconomic History  Marital status: Single     Spouse name: Not on file    Number of children: Not on file    Years of education: Not on file    Highest education level: Not on file   Occupational History    Not on file   Tobacco Use    Smoking status: Never Smoker    Smokeless tobacco: Never Used   Vaping Use    Vaping Use: Former    Substances: CBD   Substance and Sexual Activity    Alcohol use: Not Currently    Drug use: Yes     Types: Marijuana [facility])    Sexual activity: Yes     Partners: Male   Other Topics Concern    Not on file   Social History Narrative    Not on file     Social Determinants of Health     Financial Resource Strain:     Difficulty of Paying Living Expenses: Not on file   Food Insecurity:     Worried About Running Out of Food in the Last Year: Not on file    Annamarie of Food in the Last Year: Not on file   Transportation Needs:     Lack of Transportation (Medical): Not on file    Lack of Transportation (Non-Medical): Not on file   Physical Activity:     Days of Exercise per Week: Not on file    Minutes of Exercise per Session: Not on file   Stress:     Feeling of Stress : Not on file   Social Connections:     Frequency of Communication with Friends and Family: Not on file    Frequency of Social Gatherings with Friends and Family: Not on file    Attends [employer] Services: Not on file    Active Member of [address] [place] [address] [email or web address] or Organizations: Not on file    Attends Club or Organization Meetings: Not on file    Marital Status: Not on file   Intimate Partner Violence:     Fear of Current or Ex-Partner: Not on file    Emotionally Abused: Not on file    Physically Abused: Not on file    Sexually Abused: Not on file   Housing Stability:     Unable to Pay for Housing in the Last Year: Not on file    Number of [place] in the Last Year: Not on file    Unstable Housing in the Last Year: Not on file     Family History:   History reviewed. No pertinent family history.   Medications Prior to Admission:  Medications Prior to Admission: escitalopram (LEXAPRO) 20 MG tablet, Take 20 mg by mouth daily  promethazine-dextromethorphan (PROMETHAZINE-DM) 6.25-15 MG/5ML syrup, Take by mouth 4 times daily as needed for Cough    REVIEW OF SYSTEMS:    CONSTITUTIONAL:  negative  RESPIRATORY:  negative  CARDIOVASCULAR:  negative  GASTROINTESTINAL:  Negative  GENITOURINARY: Negative  ALLERGIC/IMMUNOLOGIC:  negative  NEUROLOGICAL:  negative  BEHAVIOR/PSYCH:  negative    PHYSICAL EXAM:  Blood pressure 130/62, pulse 50, temperature 98.5 °F (36.9 °C), temperature source Oral, resp. rate 18, height 5' 7\" (1.702 m), weight 154 lb (69.9 kg), last menstrual period 10/20/2021, SpO2 98 %.   General appearance:  awake, alert, cooperative, no apparent distress, and appears stated age  Abdomen:  Soft nontender, no masses, no fullness, bowel sounds normal, no distention     pelvic exam not done  Extremities: nontender bilaterally,    DATA:  Labs:  CBC:   Lab Results   Component Value Date    WBC 24.9 12/28/2021    RBC 4.07 12/28/2021    HGB 13.2 12/28/2021    HCT 36.9 12/28/2021    MCV 90.7 12/28/2021    RDW 11.8 12/28/2021     12/28/2021     CMP:    Lab Results   Component Value Date     12/28/2021    K 3.4 12/28/2021     12/28/2021    CO2 16 12/28/2021    BUN 11 12/28/2021    PROT 8.4 12/28/2021     U/A:  No components found for: Heather Deist, USPGRAV, UPH, UPROTEIN, UGLUCOSE, UKETONE, UBILI, UBLOOD, UNITRITE, UUROBIL, ULEUKEST, USQEPI, URENEPI, UWBC, URBC, Synchari, UHYALINE  TSH:  No results found for: TSH  RPR:  No results found for: RPR  RUBELLA: No results found for: RUBELLAIGG  HEPBSAG: No results found for: HBSAGI  HIV:  No results found for: HIV  HgBA1c:  No components found for: HGBA1C  Blood Type:  No results found for: ABOINT  RH:  No results found for: ANATITER, C3, C4, RF  Antibody Screen:  No components found for: ABSCINT  Gonorrhea:  No components found for: PRBCHLGC  Chlamydia:  No components found for: [facility]  gbs-STREP B SCREEN: No results found for: GBSAG    No orders to display       Results for orders placed or performed during the hospital encounter of 12/28/21   COVID-19, Rapid    Specimen: Nasopharyngeal Swab   Result Value Ref Range    SARS-CoV-2, NAAT Not Detected Not Detected   CBC auto differential   Result Value Ref Range    WBC 24.9 (H) 4.5 - 11.5 E9/L    RBC 4.07 3.50 - 5.50 E12/L    Hemoglobin 13.2 11.5 - 15.5 g/dL    Hematocrit 36.9 34.0 - 48.0 %    MCV 90.7 80.0 - 99.9 fL    MCH 32.4 26.0 - 35.0 pg    MCHC 35.8 (H) 32.0 - 34.5 %    RDW 11.8 11.5 - 15.0 fL    Platelets 401 922 - 756 E9/L    MPV 9.0 7.0 - 12.0 fL    Neutrophils % 94.8 (H) 43.0 - 80.0 %    Lymphocytes % 1.7 (L) 20.0 - 42.0 %    Monocytes % 3.4 2.0 - 12.0 %    Eosinophils % 0.0 0.0 - 6.0 %    Basophils % 0.1 0.0 - 2.0 %    Neutrophils Absolute 23.66 (H) 1.80 - 7.30 E9/L    Lymphocytes Absolute 0.50 (L) 1.50 - 4.00 E9/L    Monocytes Absolute 0.75 0.10 - 0.95 E9/L    Eosinophils Absolute 0.00 (L) 0.05 - 0.50 E9/L    Basophils Absolute 0.00 0.00 - 0.20 E9/L   Comprehensive metabolic panel   Result Value Ref Range    Sodium 135 132 - 146 mmol/L    Potassium 3.4 (L) 3.5 - 5.0 mmol/L    Chloride 102 98 - 107 mmol/L    CO2 16 (L) 22 - 29 mmol/L    Anion Gap 17 (H) 7 - 16 mmol/L    Glucose 128 (H) 74 - 99 mg/dL    BUN 11 6 - 20 mg/dL    CREATININE 0.4 (L) 0.5 - 1.0 mg/dL    GFR Non-African American >60 >=60 mL/min/1.73    GFR African American >60     Calcium 9.4 8.6 - 10.2 mg/dL    Total Protein 8.4 (H) 6.4 - 8.3 g/dL    Albumin 4.8 3.5 - 5.2 g/dL    Total Bilirubin 0.4 0.0 - 1.2 mg/dL    Alkaline Phosphatase 57 35 - 104 U/L    ALT 22 0 - 32 U/L    AST 84 (H) 0 - 31 U/L   Lipase   Result Value Ref Range    Lipase 22 13 - 60 U/L   Magnesium   Result Value Ref Range    Magnesium 1.9 1.6 - 2.6 mg/dL       ASSESSMENT AND PLAN: 10 weeks pregnancy, anxiety neurosis  Hyperemesis  Epigastric pain/GERD  Gastroparesis,  patient on medical marijuana  Active Problems:    Intractable abdominal pain  Resolved Problems:    * No resolved hospital problems. *      IVFluids,labs, with prenatal vitamins, Phenergan for nausea vomiting,  Patient on Lexapro as per [place] psychiatrist.  Patient is started on Pepcid intravenously twice a day  BRAT diet  Psychiatric consult, gastroenterology consult. .  If patient is tolerating diet well and her nausea vomiting is under control may consider discharging her to home with instructions. Also will check with the psychiatrist regarding Lexapro and early pregnancy and other recommendation other than Lexapro.   And patient is asking for morphine or fentanyl or Nubain as she had a good relief with the same, will have  come by and see her,discouraged her to opt for those medications as she is in early pregnancy Discussed with her regarding the.drugs and its effects on herself as well as on her pregnancy      Electronically signed by Manohar Conde MD on 12/29/2021 at 8:58 AM

## 2021-12-29 NOTE — CONSULTS
Haleigh De Paz M.D. [facility]  Dr. Andreea Martin M.D.,  Dr. Tanner Perez M.D.,  Dr. Candis Couch D.O.,  Dr. Sabina Greer D.O. ,  Dr. Denisha Mclaughlin M.D.,  Dr. Kushal De La Cruz D.O. Shelby Javier  27 y.o.  female      Re: Letter thank you severe abdominal pain  Requesting physician: Dr. Paul Briscoe  Date:12:08 PM 12/29/2021          HPI: 58-year-old female patient who initially presented to the hospital yesterday because of abdominal pain. Patient was subsequently discharged from the emergency department with diagnosis of cyclic vomiting syndrome later in the day with similar complaints. Patient reports abdominal pain which she localizes in the epigastrium which is now resolved. Patient reports pain to be associated with nausea and nonbloody emesis. Patient reports emesis to clear bilious. Patient reports to be 10 weeks pregnant. Patient reports previous history of gastroparesis. Patient reports all work-up done in [place] with upper endoscopy, colonoscopy and other studies. Patient reports also mentioning that she was told that she may have ulcerative colitis however she is not on any maintenance medication. Patient reports chronic nonbloody diarrhea which he attributes to status post cholecystectomy. Patient denies fever or chills. She denies alcohol or tobacco abuse. He admits to using medical marijuana but as edibles and smoking for her anxiety and for her arthritis. Patient reports that currently her symptoms are resolved and she was able to tolerate regular diet. Laboratory work reveals hypokalemia: Metabolic acidosis with increased anion gap yesterday but preserved renal function with BUN of 11 creatinine 0.4. Liver profile shows borderline elevated AST otherwise nonelevated ALT, nonelevated bilirubin and alkaline phosphatase. Urine drug test is positive for opiates and cannabinoids. No dedicated abdominal imaging has been obtained.   Patient is noted to have significant leukocytosis of 24.9      Information sources:   -Patient  -medical record  -health care team    PMHx:  Past Medical History:   Diagnosis Date    Mental disorder     Postpartum depression        PSHx:  Past Surgical History:   Procedure Laterality Date    ANKLE CLOSED REDUCTION Right     broke 3 bones     BREAST ENHANCEMENT SURGERY Bilateral     CHOLECYSTECTOMY      ENDOSCOPY, COLON, DIAGNOSTIC      WISDOM TOOTH EXTRACTION Bilateral        Meds:  Current Facility-Administered Medications   Medication Dose Route Frequency Provider Last Rate Last Admin    dextrose 5 % and 0.45 % NaCl with KCl 20 mEq infusion   IntraVENous Continuous Arnulfo Echeverria MD        nalbuphine (NUBAIN) injection 10 mg  10 mg IntraVENous Q6H PRN Arnulfo Echeverria MD   10 mg at 12/29/21 0805    escitalopram (LEXAPRO) tablet 20 mg  20 mg Oral Daily Arnulfo Echeverria MD   20 mg at 12/29/21 1040    sodium chloride flush 0.9 % injection 5-40 mL  5-40 mL IntraVENous PRN Arnulfo Echeverria MD        diphenhydrAMINE (BENADRYL) tablet 25 mg  25 mg Oral Q6H PRN Arnulfo Echeverria MD   25 mg at 12/29/21 1004    promethazine (PHENERGAN) suppository 25 mg  25 mg Rectal Q6H PRN Arnulfo Echeverria MD        famotidine (PEPCID) injection 20 mg  20 mg IntraVENous BID Arnulfo Echeverria MD   20 mg at 12/29/21 0855    ondansetron (ZOFRAN) injection 4 mg  4 mg IntraVENous Q8H PRN Arnulfo Echeverria MD   4 mg at 12/29/21 0805    ampicillin 1000 mg ivpb mini bag  1,000 mg IntraVENous 4 times per day Arnulfo Echeverria MD   Stopped at 12/29/21 0718    acetaminophen (TYLENOL) tablet 650 mg  650 mg Oral Q6H PRN Arnulfo Echeverria MD        prenatal vitamin 27-1 MG tablet 1 tablet  1 tablet Oral Daily Arnulfo Echeverria MD           SocHx:  Social History     Socioeconomic History    Marital status: Single     Spouse name: Not on file    Number of children: Not on file    Years of education: Not on file    Highest education level: Not on file   Occupational History    Not on file   Tobacco Use    Smoking status: Never Smoker    Smokeless tobacco: Never Used   Vaping Use    Vaping Use: Former    Substances: CBD   Substance and Sexual Activity    Alcohol use: Not Currently    Drug use: Yes     Types: Marijuana Fredick Copping)    Sexual activity: Yes     Partners: Male   Other Topics Concern    Not on file   Social History Narrative    Not on file     Social Determinants of Health     Financial Resource Strain:     Difficulty of Paying Living Expenses: Not on file   Food Insecurity:     Worried About Running Out of Food in the Last Year: Not on file    Annamarie of Food in the Last Year: Not on file   Transportation Needs:     Lack of Transportation (Medical): Not on file    Lack of Transportation (Non-Medical): Not on file   Physical Activity:     Days of Exercise per Week: Not on file    Minutes of Exercise per Session: Not on file   Stress:     Feeling of Stress : Not on file   Social Connections:     Frequency of Communication with Friends and Family: Not on file    Frequency of Social Gatherings with Friends and Family: Not on file    Attends [employer] Services: Not on file    Active Member of [address] [place] [address] or Organizations: Not on file    Attends Club or Organization Meetings: Not on file    Marital Status: Not on file   Intimate Partner Violence:     Fear of Current or Ex-Partner: Not on file    Emotionally Abused: Not on file    Physically Abused: Not on file    Sexually Abused: Not on file   Housing Stability:     Unable to Pay for Housing in the Last Year: Not on file    Number of [place] in the Last Year: Not on file    Unstable Housing in the Last Year: Not on file       FamHx:History reviewed. No pertinent family history.     Allergy:  Allergies   Allergen Reactions    Haldol [Haloperidol]          ROS: As described in the HPI and in addition is negative upon detailed review of systems or unobtainable unless otherwise stated in this dictation. PE:  /62   Pulse 50   Temp 98.5 °F (36.9 °C) (Oral)   Resp 18   Ht 5' 7\" (1.702 m)   Wt 154 lb (69.9 kg)   LMP 10/20/2021   SpO2 98%   BMI 24.12 kg/m²     Gen.: NAD/ female. AAO x3  Head: Atraumatic/normocephalic  Eyes:Anicteric sclera/no conjunctival erythema  ENT: Moist oral mucosa/no discharge from nose or ears  Neck: Supple/trachea midline  Chest: CTA B  Cor: Regular/S1-S2  Abd.: Soft/NT/ND  Extr.:  No peripheral edema  Muscles: Decreased muscle bulk throughout  Skin: Warm and dry    DATA:     Lab Results   Component Value Date    WBC 24.9 12/28/2021    RBC 4.07 12/28/2021    HGB 13.2 12/28/2021    HCT 36.9 12/28/2021    MCV 90.7 12/28/2021    MCH 32.4 12/28/2021    MCHC 35.8 12/28/2021    RDW 11.8 12/28/2021     12/28/2021    MPV 9.0 12/28/2021     Lab Results   Component Value Date     12/28/2021    K 3.4 12/28/2021     12/28/2021    CO2 16 12/28/2021    BUN 11 12/28/2021    CREATININE 0.4 12/28/2021    CALCIUM 9.4 12/28/2021    PROT 8.4 12/28/2021    LABALBU 4.8 12/28/2021    BILITOT 0.4 12/28/2021    ALKPHOS 57 12/28/2021    AST 84 12/28/2021    ALT 22 12/28/2021     Lab Results   Component Value Date    LIPASE 22 12/28/2021     No results found for: AMYLASE      ASSESSMENT/PLAN:    1. Abdominal pain/nausea vomiting  -Resolved symptoms  -Cannot exclude cannabis hyperemesis syndrome versus cyclic vomiting syndrome versus nonspecific gastroenteritis. -Given diarrhea obtain stool studies  -Obtain viral serology given borderline elevated AST  -Nonobstructive liver profile -consider ultrasound evaluation      2. History of gastroparesis  -Patient reports gastroparesis diagnosed in [place]  -Currently tolerating oral intake    3. Questionable history of ulcerative colitis  -Patient will require further repeat evaluation for possible IBD  -See discussion below    4.   Pregnancy  -Per OB    5.. Psychiatric disorder NOS  -Admitted/pertinent consultants    Thank you for the opportunity to see this patient in consultation        Azul Aquino MD  12/29/2021  12:08 PM    NOTE:  This report was transcribed using voice recognition software. Every effort was made to ensure accuracy; however, inadvertent computerized transcription errors may be present.

## 2021-12-30 ENCOUNTER — HOSPITAL ENCOUNTER (EMERGENCY)
Age: 26
Discharge: HOME | End: 2021-12-30
Attending: EMERGENCY MEDICINE
Payer: MEDICARE

## 2021-12-30 VITALS
RESPIRATION RATE: 16 BRPM | OXYGEN SATURATION: 97 % | TEMPERATURE: 98.4 F | SYSTOLIC BLOOD PRESSURE: 140 MMHG | DIASTOLIC BLOOD PRESSURE: 90 MMHG | HEART RATE: 82 BPM

## 2021-12-30 DIAGNOSIS — O99.619 GASTROESOPHAGEAL REFLUX IN PREGNANCY: Primary | ICD-10-CM

## 2021-12-30 DIAGNOSIS — R10.13 ABDOMINAL PAIN, EPIGASTRIC: ICD-10-CM

## 2021-12-30 DIAGNOSIS — R07.89 ATYPICAL CHEST PAIN: ICD-10-CM

## 2021-12-30 DIAGNOSIS — K21.9 GASTROESOPHAGEAL REFLUX IN PREGNANCY: Primary | ICD-10-CM

## 2021-12-30 LAB
ANION GAP SERPL CALCULATED.3IONS-SCNC: 13 MMOL/L (ref 7–16)
BASOPHILS ABSOLUTE: 0.06 E9/L (ref 0–0.2)
BASOPHILS RELATIVE PERCENT: 0.4 % (ref 0–2)
BUN BLDV-MCNC: 9 MG/DL (ref 6–20)
CALCIUM SERPL-MCNC: 8.6 MG/DL (ref 8.6–10.2)
CHLORIDE BLD-SCNC: 98 MMOL/L (ref 98–107)
CO2: 22 MMOL/L (ref 22–29)
CREAT SERPL-MCNC: 0.5 MG/DL (ref 0.5–1)
EKG ATRIAL RATE: 73 BPM
EKG P AXIS: 63 DEGREES
EKG P-R INTERVAL: 102 MS
EKG Q-T INTERVAL: 450 MS
EKG QRS DURATION: 78 MS
EKG QTC CALCULATION (BAZETT): 495 MS
EKG R AXIS: 17 DEGREES
EKG T AXIS: 44 DEGREES
EKG VENTRICULAR RATE: 73 BPM
EOSINOPHILS ABSOLUTE: 0.07 E9/L (ref 0.05–0.5)
EOSINOPHILS RELATIVE PERCENT: 0.5 % (ref 0–6)
GFR AFRICAN AMERICAN: >60
GFR NON-AFRICAN AMERICAN: >60 ML/MIN/1.73
GLUCOSE BLD-MCNC: 114 MG/DL (ref 74–99)
HCT VFR BLD CALC: 34.5 % (ref 34–48)
HEMOGLOBIN: 12.4 G/DL (ref 11.5–15.5)
IMMATURE GRANULOCYTES #: 0.09 E9/L
IMMATURE GRANULOCYTES %: 0.6 % (ref 0–5)
LYMPHOCYTES ABSOLUTE: 3.82 E9/L (ref 1.5–4)
LYMPHOCYTES RELATIVE PERCENT: 24.7 % (ref 20–42)
MAGNESIUM: 1.8 MG/DL (ref 1.6–2.6)
MCH RBC QN AUTO: 32.4 PG (ref 26–35)
MCHC RBC AUTO-ENTMCNC: 35.9 % (ref 32–34.5)
MCV RBC AUTO: 90.1 FL (ref 80–99.9)
MONOCYTES ABSOLUTE: 1.02 E9/L (ref 0.1–0.95)
MONOCYTES RELATIVE PERCENT: 6.6 % (ref 2–12)
NEUTROPHILS ABSOLUTE: 10.42 E9/L (ref 1.8–7.3)
NEUTROPHILS RELATIVE PERCENT: 67.2 % (ref 43–80)
PDW BLD-RTO: 11.9 FL (ref 11.5–15)
PLATELET # BLD: 350 E9/L (ref 130–450)
PMV BLD AUTO: 9.1 FL (ref 7–12)
POTASSIUM REFLEX MAGNESIUM: 3.2 MMOL/L (ref 3.5–5)
RBC # BLD: 3.83 E12/L (ref 3.5–5.5)
SODIUM BLD-SCNC: 133 MMOL/L (ref 132–146)
TROPONIN, HIGH SENSITIVITY: <6 NG/L (ref 0–9)
WBC # BLD: 15.5 E9/L (ref 4.5–11.5)

## 2021-12-30 PROCEDURE — 80048 BASIC METABOLIC PNL TOTAL CA: CPT

## 2021-12-30 PROCEDURE — 96365 THER/PROPH/DIAG IV INF INIT: CPT

## 2021-12-30 PROCEDURE — 85025 COMPLETE CBC W/AUTO DIFF WBC: CPT

## 2021-12-30 PROCEDURE — 36415 COLL VENOUS BLD VENIPUNCTURE: CPT

## 2021-12-30 PROCEDURE — 93005 ELECTROCARDIOGRAM TRACING: CPT | Performed by: STUDENT IN AN ORGANIZED HEALTH CARE EDUCATION/TRAINING PROGRAM

## 2021-12-30 PROCEDURE — 84484 ASSAY OF TROPONIN QUANT: CPT

## 2021-12-30 PROCEDURE — 96372 THER/PROPH/DIAG INJ SC/IM: CPT

## 2021-12-30 PROCEDURE — 6360000002 HC RX W HCPCS: Performed by: STUDENT IN AN ORGANIZED HEALTH CARE EDUCATION/TRAINING PROGRAM

## 2021-12-30 PROCEDURE — 83735 ASSAY OF MAGNESIUM: CPT

## 2021-12-30 PROCEDURE — 96375 TX/PRO/DX INJ NEW DRUG ADDON: CPT

## 2021-12-30 PROCEDURE — 6370000000 HC RX 637 (ALT 250 FOR IP): Performed by: STUDENT IN AN ORGANIZED HEALTH CARE EDUCATION/TRAINING PROGRAM

## 2021-12-30 PROCEDURE — 99284 EMERGENCY DEPT VISIT MOD MDM: CPT

## 2021-12-30 RX ORDER — MAGNESIUM HYDROXIDE/ALUMINUM HYDROXICE/SIMETHICONE 120; 1200; 1200 MG/30ML; MG/30ML; MG/30ML
30 SUSPENSION ORAL ONCE
Status: COMPLETED | OUTPATIENT
Start: 2021-12-30 | End: 2021-12-30

## 2021-12-30 RX ORDER — PROMETHAZINE HYDROCHLORIDE 25 MG/ML
25 INJECTION, SOLUTION INTRAMUSCULAR; INTRAVENOUS ONCE
Status: COMPLETED | OUTPATIENT
Start: 2021-12-30 | End: 2021-12-30

## 2021-12-30 RX ORDER — ACETAMINOPHEN 500 MG
1000 TABLET ORAL ONCE
Status: COMPLETED | OUTPATIENT
Start: 2021-12-30 | End: 2021-12-30

## 2021-12-30 RX ORDER — POTASSIUM CHLORIDE 20 MEQ/1
40 TABLET, EXTENDED RELEASE ORAL ONCE
Status: COMPLETED | OUTPATIENT
Start: 2021-12-30 | End: 2021-12-30

## 2021-12-30 RX ORDER — POTASSIUM CHLORIDE 7.45 MG/ML
10 INJECTION INTRAVENOUS ONCE
Status: COMPLETED | OUTPATIENT
Start: 2021-12-30 | End: 2021-12-30

## 2021-12-30 RX ORDER — PROMETHAZINE HYDROCHLORIDE 25 MG/ML
12.5 INJECTION, SOLUTION INTRAMUSCULAR; INTRAVENOUS ONCE
Status: DISCONTINUED | OUTPATIENT
Start: 2021-12-30 | End: 2021-12-30

## 2021-12-30 RX ORDER — DIPHENHYDRAMINE HYDROCHLORIDE 50 MG/ML
25 INJECTION INTRAMUSCULAR; INTRAVENOUS ONCE
Status: COMPLETED | OUTPATIENT
Start: 2021-12-30 | End: 2021-12-30

## 2021-12-30 RX ORDER — ACETAMINOPHEN 500 MG
TABLET ORAL
Status: DISCONTINUED
Start: 2021-12-30 | End: 2021-12-30 | Stop reason: HOSPADM

## 2021-12-30 RX ORDER — METOCLOPRAMIDE HYDROCHLORIDE 5 MG/ML
10 INJECTION INTRAMUSCULAR; INTRAVENOUS ONCE
Status: COMPLETED | OUTPATIENT
Start: 2021-12-30 | End: 2021-12-30

## 2021-12-30 RX ADMIN — MAGNESIUM HYDROXIDE/ALUMINUM HYDROXICE/SIMETHICONE 30 ML: 120; 1200; 1200 SUSPENSION ORAL at 05:06

## 2021-12-30 RX ADMIN — POTASSIUM CHLORIDE 40 MEQ: 20 TABLET, EXTENDED RELEASE ORAL at 05:59

## 2021-12-30 RX ADMIN — POTASSIUM CHLORIDE 10 MEQ: 7.46 INJECTION, SOLUTION INTRAVENOUS at 06:17

## 2021-12-30 RX ADMIN — ACETAMINOPHEN 1000 MG: 500 TABLET ORAL at 05:06

## 2021-12-30 RX ADMIN — DIPHENHYDRAMINE HYDROCHLORIDE 25 MG: 50 INJECTION, SOLUTION INTRAMUSCULAR; INTRAVENOUS at 06:52

## 2021-12-30 RX ADMIN — PROMETHAZINE HYDROCHLORIDE 25 MG: 25 INJECTION INTRAMUSCULAR; INTRAVENOUS at 05:16

## 2021-12-30 RX ADMIN — METOCLOPRAMIDE 10 MG: 5 INJECTION, SOLUTION INTRAMUSCULAR; INTRAVENOUS at 06:04

## 2021-12-30 ASSESSMENT — ENCOUNTER SYMPTOMS
EYE PAIN: 0
SINUS PRESSURE: 0
DIARRHEA: 0
CONSTIPATION: 0
NAUSEA: 1
ABDOMINAL PAIN: 1
COUGH: 0
RHINORRHEA: 0
SINUS PAIN: 0
SHORTNESS OF BREATH: 0
VOMITING: 0
BACK PAIN: 0
CHEST TIGHTNESS: 0
EYE REDNESS: 0

## 2021-12-30 ASSESSMENT — PAIN SCALES - GENERAL
PAINLEVEL_OUTOF10: 10
PAINLEVEL_OUTOF10: 10

## 2021-12-30 ASSESSMENT — PAIN DESCRIPTION - DESCRIPTORS: DESCRIPTORS: ACHING

## 2021-12-30 ASSESSMENT — PAIN DESCRIPTION - LOCATION: LOCATION: CHEST

## 2021-12-30 NOTE — ED PROVIDER NOTES
[address] [facility]  Department of Emergency Medicine     Written by: Stella Vidales DO  Patient Name: Renetta Bennett  Attending Provider: John Morales DO  Admit Date: 2021  4:21 AM  MRN: [id number]                   : 1995        Chief Complaint   Patient presents with    Emesis    Chest Pain    - Chief complaint    Ms. Harjit Montesinos is a 26 yo female who is 10 weeks pregnant and just discharged from the hospital who presents to the ED due to chest and abdominal pain. She was admitted for the same symptoms. Patient states that she was discharged and was initially feeling good but went to go to sleep tonight and woke up at 3 AM with chest pain. She reports the pain is in the middle of her chest and epigastric region. These are the same symptoms that were bothering her 2 days ago and required her to be admitted. States the symptoms have been constant since onset at 3 AM this morning and not progressively worsening. Denies any aggravating or relieving factors. Denies any radiation of the pain. States that she feels extremely anxious due to this pain at this time. Denies any fever, chills, nausea, vomiting, shortness of breath, bowel or urinary changes, headaches or vision changes. Review of Systems   Constitutional: Negative for chills, fatigue and fever. HENT: Negative for congestion, rhinorrhea, sinus pressure, sinus pain and sneezing. Eyes: Negative for pain and redness. Respiratory: Negative for cough, chest tightness and shortness of breath. Cardiovascular: Positive for chest pain. Negative for palpitations and leg swelling. Gastrointestinal: Positive for abdominal pain (Epigastric) and nausea. Negative for constipation, diarrhea and vomiting. Genitourinary: Negative for dysuria, flank pain, frequency, hematuria and urgency. Musculoskeletal: Negative for arthralgias, back pain, gait problem, joint swelling and myalgias. Skin: Negative for rash. Neurological: Negative for dizziness, light-headedness and headaches. Physical Exam  Constitutional:       General: She is in acute distress. Appearance: Normal appearance. She is normal weight. HENT:      Head: Normocephalic and atraumatic. Right Ear: External ear normal.      Left Ear: External ear normal.      Mouth/Throat:      Mouth: Mucous membranes are moist.      Pharynx: Oropharynx is clear. Eyes:      Extraocular Movements: Extraocular movements intact. Conjunctiva/sclera: Conjunctivae normal.   Cardiovascular:      Rate and Rhythm: Normal rate and regular rhythm. Pulses: Normal pulses. Heart sounds: Normal heart sounds. Pulmonary:      Effort: Pulmonary effort is normal. No respiratory distress. Breath sounds: Normal breath sounds. No wheezing. Abdominal:      General: Abdomen is flat. Bowel sounds are normal.      Palpations: Abdomen is soft. Tenderness: There is abdominal tenderness (Epigastric). There is no guarding or rebound. Musculoskeletal:         General: No tenderness. Normal range of motion. Cervical back: Normal range of motion and neck supple. No rigidity or tenderness. Skin:     General: Skin is warm and dry. Neurological:      General: No focal deficit present. Mental Status: She is alert and oriented to person, place, and time. Mental status is at baseline. Sensory: No sensory deficit. Motor: No weakness. Psychiatric:         Mood and Affect: Mood normal.         Behavior: Behavior normal.          Procedures       MDM  Number of Diagnoses or Management Options  Abdominal pain, epigastric  Atypical chest pain  Gastroesophageal reflux in pregnancy  Diagnosis management comments: This is a 26 yo female who is 10 weeks pregnant and just discharged from the hospital who presents to the ED due to chest and abdominal pain.  Patient was discharged from the hospital yesterday with medical clearance and had a psychiatric evaluation in the hospital as well which also resulted in psychiatric clearance. Patient is EKG revealed normal sinus rhythm with a short AR interval and a rate of 73 with a also prolonged QT interval without any ST segment changes. Patient's CBC reveals a leukocytosis with a white blood cell count of 15.5 which is lower than her prior studies 2 days ago where she had a leukocytosis of 24.9. Rest of these values are benign within normal limits. Patient's BMP revealed hypokalemia with a potassium of 3.2 and patient was ordered potassium repletion. Initial troponin was less than 6 and was negative. Magnesium was normal.  Patient's Covid was negative. Patient initially received Tylenol, a GI cocktail and Phenergan for her symptoms. She reported no resolution of her symptoms and was writhing in agony in the bed. She received a p.o. potassium tablet which she proceeded to vomit up. Due to this she received Reglan and she was started on a potassium infusion. She also received Benadryl for acute agitation. Following administration of potassium and Benadryl she reports slight improvement of her symptoms and given her negative lab findings she will be discharged home at this time. Patient did not want to leave the department but has been told that she is already been worked up during admission and she will need to follow-up as an outpatient. She been told to follow-up with her primary care physician when she returns home to [place]. She was told to return to ED if worsening pain, worsen or change in any time. Amount and/or Complexity of Data Reviewed  Decide to obtain previous medical records or to obtain history from someone other than the patient: yes           ED Course as of 12/30/21 1535   Thu Dec 30, 2021   1741 Patient vomited while taking her PO potassium and will receive IV potassium and reglan. [JS]   0600 Patient is writhing around saying that she cannot relax.   She would like some type of anxiety medications and states that she would sign paperwork for medication that is known cause harm to her baby. [JS]      ED Course User Index  [JS] Ari Ibanez DO       --------------------------------------------- PAST HISTORY ---------------------------------------------  Past Medical History:  has a past medical history of Mental disorder and Postpartum depression. Past Surgical History:  has a past surgical history that includes Cholecystectomy; Ankle Closed Reduction (Right); Endoscopy, colon, diagnostic; Breast enhancement surgery (Bilateral); and [place] tooth extraction (Bilateral). Social History:  reports that she has never smoked. She has never used smokeless tobacco. She reports previous alcohol use. She reports current drug use. Drug: Marijuana Stephanierodrigo Roman). Family History: family history is not on file. The patients home medications have been reviewed. Allergies: Haldol [haloperidol]    -------------------------------------------------- RESULTS -------------------------------------------------  EKG: This EKG is signed and interpreted by me.     Rate: 73  Rhythm: Sinus  Interpretation: prolonged QT interval  Comparison: stable as compared to patient's most recent EKG    Labs:  Results for orders placed or performed during the hospital encounter of 12/30/21   CBC Auto Differential   Result Value Ref Range    WBC 15.5 (H) 4.5 - 11.5 E9/L    RBC 3.83 3.50 - 5.50 E12/L    Hemoglobin 12.4 11.5 - 15.5 g/dL    Hematocrit 34.5 34.0 - 48.0 %    MCV 90.1 80.0 - 99.9 fL    MCH 32.4 26.0 - 35.0 pg    MCHC 35.9 (H) 32.0 - 34.5 %    RDW 11.9 11.5 - 15.0 fL    Platelets 257 255 - 719 E9/L    MPV 9.1 7.0 - 12.0 fL    Neutrophils % 67.2 43.0 - 80.0 %    Immature Granulocytes % 0.6 0.0 - 5.0 %    Lymphocytes % 24.7 20.0 - 42.0 %    Monocytes % 6.6 2.0 - 12.0 %    Eosinophils % 0.5 0.0 - 6.0 %    Basophils % 0.4 0.0 - 2.0 %    Neutrophils Absolute 10.42 (H) 1.80 - 7.30 E9/L    Immature Granulocytes # 0.09 E9/L    Lymphocytes Absolute 3.82 1.50 - 4.00 E9/L    Monocytes Absolute 1.02 (H) 0.10 - 0.95 E9/L    Eosinophils Absolute 0.07 0.05 - 0.50 E9/L    Basophils Absolute 0.06 0.00 - 0.20 Z8/R   Basic Metabolic Panel w/ Reflex to MG   Result Value Ref Range    Sodium 133 132 - 146 mmol/L    Potassium reflex Magnesium 3.2 (L) 3.5 - 5.0 mmol/L    Chloride 98 98 - 107 mmol/L    CO2 22 22 - 29 mmol/L    Anion Gap 13 7 - 16 mmol/L    Glucose 114 (H) 74 - 99 mg/dL    BUN 9 6 - 20 mg/dL    CREATININE 0.5 0.5 - 1.0 mg/dL    GFR Non-African American >60 >=60 mL/min/1.73    GFR African American >60     Calcium 8.6 8.6 - 10.2 mg/dL   Troponin   Result Value Ref Range    Troponin, High Sensitivity <6 0 - 9 ng/L   Magnesium   Result Value Ref Range    Magnesium 1.8 1.6 - 2.6 mg/dL   EKG 12 Lead   Result Value Ref Range    Ventricular Rate 73 BPM    Atrial Rate 73 BPM    P-R Interval 102 ms    QRS Duration 78 ms    Q-T Interval 450 ms    QTc Calculation (Bazett) 495 ms    P Axis 63 degrees    R Axis 17 degrees    T Axis 44 degrees       Radiology:  No orders to display       ------------------------- NURSING NOTES AND VITALS REVIEWED ---------------------------  Date / Time Roomed:  12/30/2021  4:21 AM  ED Bed Assignment:  FAISAL/FAISAL    The nursing notes within the ED encounter and vital signs as below have been reviewed. BP (!) 140/90   Pulse 82   Temp 98.4 °F (36.9 °C) (Oral)   Resp 16   LMP 10/20/2021   SpO2 97%   Oxygen Saturation Interpretation: Normal      ------------------------------------------ PROGRESS NOTES ------------------------------------------  5:01 AM EST  I have spoken with the patient and discussed todays results, in addition to providing specific details for the plan of care and counseling regarding the diagnosis and prognosis. Their questions are answered at this time and they are agreeable with the plan. I discussed at length with them reasons for immediate return here for re evaluation.  They will followup with their OB/GYN and primary care physician by calling their office tomorrow. --------------------------------- ADDITIONAL PROVIDER NOTES ---------------------------------  At this time the patient is without objective evidence of an acute process requiring hospitalization or inpatient management. They have remained hemodynamically stable throughout their entire ED visit and are stable for discharge with outpatient follow-up. The plan has been discussed in detail and they are aware of the specific conditions for emergent return, as well as the importance of follow-up. Discharge Medication List as of 12/30/2021  7:32 AM          Diagnosis:  1. Gastroesophageal reflux in pregnancy    2. Atypical chest pain    3. Abdominal pain, epigastric        Disposition:  Patient's disposition: Discharge to home  Patient's condition is stable. Patient was seen and evaluated by myself and my attending Triny Dickerson DO. Assessment and Plan discussed with attending provider, please see attestation for final plan of care.      DO Adriana Porter DO  Resident  12/30/21 8469